# Patient Record
Sex: FEMALE | Race: OTHER | HISPANIC OR LATINO | URBAN - METROPOLITAN AREA
[De-identification: names, ages, dates, MRNs, and addresses within clinical notes are randomized per-mention and may not be internally consistent; named-entity substitution may affect disease eponyms.]

---

## 2022-04-17 ENCOUNTER — EMERGENCY (EMERGENCY)
Facility: HOSPITAL | Age: 77
LOS: 1 days | Discharge: HOME | End: 2022-04-17
Attending: EMERGENCY MEDICINE | Admitting: EMERGENCY MEDICINE
Payer: MEDICARE

## 2022-04-17 VITALS
SYSTOLIC BLOOD PRESSURE: 148 MMHG | HEART RATE: 93 BPM | OXYGEN SATURATION: 100 % | RESPIRATION RATE: 16 BRPM | DIASTOLIC BLOOD PRESSURE: 68 MMHG | TEMPERATURE: 99 F

## 2022-04-17 VITALS
OXYGEN SATURATION: 100 % | RESPIRATION RATE: 16 BRPM | SYSTOLIC BLOOD PRESSURE: 119 MMHG | TEMPERATURE: 98 F | DIASTOLIC BLOOD PRESSURE: 56 MMHG

## 2022-04-17 DIAGNOSIS — Z90.710 ACQUIRED ABSENCE OF BOTH CERVIX AND UTERUS: Chronic | ICD-10-CM

## 2022-04-17 LAB
ALBUMIN SERPL ELPH-MCNC: 4.5 G/DL — SIGNIFICANT CHANGE UP (ref 3.3–5)
ALP SERPL-CCNC: 97 U/L — SIGNIFICANT CHANGE UP (ref 40–120)
ALT FLD-CCNC: 20 U/L — SIGNIFICANT CHANGE UP (ref 4–33)
ANION GAP SERPL CALC-SCNC: 13 MMOL/L — SIGNIFICANT CHANGE UP (ref 7–14)
AST SERPL-CCNC: 20 U/L — SIGNIFICANT CHANGE UP (ref 4–32)
BASOPHILS # BLD AUTO: 0.04 K/UL — SIGNIFICANT CHANGE UP (ref 0–0.2)
BASOPHILS NFR BLD AUTO: 0.4 % — SIGNIFICANT CHANGE UP (ref 0–2)
BILIRUB SERPL-MCNC: 0.3 MG/DL — SIGNIFICANT CHANGE UP (ref 0.2–1.2)
BUN SERPL-MCNC: 26 MG/DL — HIGH (ref 7–23)
CALCIUM SERPL-MCNC: 9.3 MG/DL — SIGNIFICANT CHANGE UP (ref 8.4–10.5)
CHLORIDE SERPL-SCNC: 107 MMOL/L — SIGNIFICANT CHANGE UP (ref 98–107)
CO2 SERPL-SCNC: 18 MMOL/L — LOW (ref 22–31)
CREAT SERPL-MCNC: 1.5 MG/DL — HIGH (ref 0.5–1.3)
CULTURE RESULTS: SIGNIFICANT CHANGE UP
EGFR: 36 ML/MIN/1.73M2 — LOW
EOSINOPHIL # BLD AUTO: 0.54 K/UL — HIGH (ref 0–0.5)
EOSINOPHIL NFR BLD AUTO: 5.1 % — SIGNIFICANT CHANGE UP (ref 0–6)
GLUCOSE SERPL-MCNC: 184 MG/DL — HIGH (ref 70–99)
HCT VFR BLD CALC: 33 % — LOW (ref 34.5–45)
HGB BLD-MCNC: 10.5 G/DL — LOW (ref 11.5–15.5)
IANC: 8.45 K/UL — HIGH (ref 1.8–7.4)
IMM GRANULOCYTES NFR BLD AUTO: 0.3 % — SIGNIFICANT CHANGE UP (ref 0–1.5)
LYMPHOCYTES # BLD AUTO: 1.02 K/UL — SIGNIFICANT CHANGE UP (ref 1–3.3)
LYMPHOCYTES # BLD AUTO: 9.6 % — LOW (ref 13–44)
MAGNESIUM SERPL-MCNC: 2.1 MG/DL — SIGNIFICANT CHANGE UP (ref 1.6–2.6)
MCHC RBC-ENTMCNC: 29.6 PG — SIGNIFICANT CHANGE UP (ref 27–34)
MCHC RBC-ENTMCNC: 31.8 GM/DL — LOW (ref 32–36)
MCV RBC AUTO: 93 FL — SIGNIFICANT CHANGE UP (ref 80–100)
MONOCYTES # BLD AUTO: 0.57 K/UL — SIGNIFICANT CHANGE UP (ref 0–0.9)
MONOCYTES NFR BLD AUTO: 5.4 % — SIGNIFICANT CHANGE UP (ref 2–14)
NEUTROPHILS # BLD AUTO: 8.45 K/UL — HIGH (ref 1.8–7.4)
NEUTROPHILS NFR BLD AUTO: 79.2 % — HIGH (ref 43–77)
NRBC # BLD: 0 /100 WBCS — SIGNIFICANT CHANGE UP
NRBC # FLD: 0 K/UL — SIGNIFICANT CHANGE UP
PHOSPHATE SERPL-MCNC: 3.9 MG/DL — SIGNIFICANT CHANGE UP (ref 2.5–4.5)
PLATELET # BLD AUTO: 297 K/UL — SIGNIFICANT CHANGE UP (ref 150–400)
POTASSIUM SERPL-MCNC: 4.6 MMOL/L — SIGNIFICANT CHANGE UP (ref 3.5–5.3)
POTASSIUM SERPL-SCNC: 4.6 MMOL/L — SIGNIFICANT CHANGE UP (ref 3.5–5.3)
PROT SERPL-MCNC: 7.7 G/DL — SIGNIFICANT CHANGE UP (ref 6–8.3)
RBC # BLD: 3.55 M/UL — LOW (ref 3.8–5.2)
RBC # FLD: 12.8 % — SIGNIFICANT CHANGE UP (ref 10.3–14.5)
SODIUM SERPL-SCNC: 138 MMOL/L — SIGNIFICANT CHANGE UP (ref 135–145)
SPECIMEN SOURCE: SIGNIFICANT CHANGE UP
WBC # BLD: 10.65 K/UL — HIGH (ref 3.8–10.5)
WBC # FLD AUTO: 10.65 K/UL — HIGH (ref 3.8–10.5)

## 2022-04-17 PROCEDURE — 99284 EMERGENCY DEPT VISIT MOD MDM: CPT

## 2022-04-17 RX ORDER — VANCOMYCIN HCL 1 G
5 VIAL (EA) INTRAVENOUS
Qty: 280 | Refills: 0
Start: 2022-04-17 | End: 2022-04-30

## 2022-04-17 NOTE — ED ADULT NURSE REASSESSMENT NOTE - NS ED NURSE REASSESS COMMENT FT1
break cover RN. received report from  RN. Pt is a/o x 3. no complaints of chest pain, headache, nausea, dizzniness, vomiting, SOB, fever, chills   verbalized. Pt has iv placed with no redness or swelling noted. Awaiting further orders. Will continue to monitor.

## 2022-04-17 NOTE — ED ADULT NURSE REASSESSMENT NOTE - NS ED NURSE REASSESS COMMENT FT1
Report received from RN in blue 3: Pt A&Ox4, Kiswahili speaking, ambulatory w assistance, respirations are even and unlabored, sating at 100% on RA. Pt attempted to give a stool sample but states "I don't have stool anymore just mucous", MD notified.

## 2022-04-17 NOTE — ED PROVIDER NOTE - ATTENDING CONTRIBUTION TO CARE
The patient is a 76-year-old woman who had a recent dental infection managed with clindamycin and who is followed for chronic kidney disease, diabetes mellitus, and hypertension who is presenting today with several weeks of diarrhea. Initially, she called her primary care physician who told her to that she likely had a viral infection and to try pepto-bismol. Per the patient, she developed diarrhea about a week after she received her antibiotic course. Initially, her diarrhea was very voluminous. She had innumerable bowel movements in a day. However, she has since developed output of mucus that is so persistent that she is incontinent of stool. She has not had any vomiting or any fevers. This has never happened to her before. She has no previous intestinal surgeries. She has no known history of sexually transmitted infections. She denies abdominal pain. She has not taken any anti-diarrheal medications other than pepto-bismol, which she has not taken in several weeks.

## 2022-04-17 NOTE — ED PROVIDER NOTE - NSICDXPASTMEDICALHX_GEN_ALL_CORE_FT
PAST MEDICAL HISTORY:  Chronic kidney disease, unspecified CKD stage     Diabetes mellitus     Hypertension

## 2022-04-17 NOTE — ED PROVIDER NOTE - CLINICAL SUMMARY MEDICAL DECISION MAKING FREE TEXT BOX
The patient is a 76-year-old woman who had a recent dental infection managed with po clindamycin and who is followed for hypertension, diabetes mellitus, and chronic kidney disease who is presenting with several weeks of voluminous diarrhea, that has progressed to incontinent mucus, highly concerning for c diff colitis.     Will obtain stool c diff tests. Will order cbc and cmp. Likely will discharge with outpatient follow-up and po antibiotics.

## 2022-04-17 NOTE — ED ADULT TRIAGE NOTE - CHIEF COMPLAINT QUOTE
Patient c/o on and off diarrhea x a month with mucous. Feels abdominal pressure. Diabetes type 2   fs = 200.

## 2022-04-17 NOTE — ED PROVIDER NOTE - NS ED ROS FT
CONSTITUTIONAL: No fever, weight loss, or fatigue  EYES: No eye pain, visual disturbances, or discharge  ENMT:  No difficulty hearing, tinnitus, vertigo; No sinus or throat pain  NECK: No pain; no stiffness  BREASTS: No pain, masses, or nipple discharge  RESPIRATORY: No cough, wheezing, chills or hemoptysis; No shortness of breath  CARDIOVASCULAR: No chest pain, palpitations, dizziness, or leg swelling  GASTROINTESTINAL: No abdominal or epigastric pain. No nausea, vomiting, or hematemesis; diarrhea as noted above   GENITOURINARY: No dysuria, frequency, hematuria, or incontinence  NEUROLOGICAL: No headaches, memory loss, loss of strength, numbness, or tremors  SKIN: No itching, burning, rashes, or lesions   LYMPH NODES: No enlarged glands  ENDOCRINE: No heat or cold intolerance; No hair loss  MUSCULOSKELETAL: No joint pain or swelling; No muscle, back, or extremity pain  PSYCHIATRIC: No depression, anxiety, mood swings, or difficulty sleeping  HEME/LYMPH: No easy bruising, or bleeding gums  ALLERY AND IMMUNOLOGIC: No hives; no eczema

## 2022-04-17 NOTE — ED ADULT NURSE NOTE - OBJECTIVE STATEMENT
A&Ox4. ambulatory. c/o diarrhea and mucous for one month after taking antibiotics for a tooth infection. NAD. pt is Icelandic speaking. daughter at bedside, translating. pt refuses translation services. pt denies chest pain, dizziness, SOB, n/v/d, HA, fevers, chills, urinary symptoms. respirations are even and un labored. abd is soft and non tender. skin intact. 22g placed to right ac. labs drawn and sent. safety precautions maintained.

## 2022-04-17 NOTE — ED PROVIDER NOTE - PATIENT PORTAL LINK FT
You can access the FollowMyHealth Patient Portal offered by [facility] by registering at the following website: [email or web address]. By joining [facility]’s FollowMyHealth portal, you will also be able to view your health information using other applications (apps) compatible with our system.

## 2022-04-17 NOTE — ED PROVIDER NOTE - NSFOLLOWUPINSTRUCTIONS_ED_ALL_ED_FT
When you first came to the emergency department, you noticed that you had been having diarrhea for several weeks. Because your diarrhea first began shortly after you completed a regimen of oral antibiotics to treat a mouth infection, your doctors worried that your diarrhea was caused by an infection in your colon called C diff. This infection occurs often in patients who take antibiotics. The antibiotics, in addition serving their purpose to treat infection elsewhere in your body, can harm the "good" bacteria in the colon, which can allow the "bad" bacteria, the C diff, to live and prosper and cause infection and diarrhea. For that reason, here in the emergency department, we ran a test to see if this was the cause of your diarrhea. Although those test results are not back yet, we feel that it is likely that you have this cause of diarrhea. For that reason, we recommend that you begin treatment with antibiotics to manage your c diff. At this time, you are able to walk, eat, drink, and use the restroom on your own, so we feel that your condition is stable and that  you are ready for discharge home. If at any time you develop fever or chills or sever abdominal discomfort or vomiting, please return to the hospital.

## 2022-04-18 PROBLEM — E11.9 TYPE 2 DIABETES MELLITUS WITHOUT COMPLICATIONS: Chronic | Status: ACTIVE | Noted: 2022-04-17

## 2022-04-18 PROBLEM — I10 ESSENTIAL (PRIMARY) HYPERTENSION: Chronic | Status: ACTIVE | Noted: 2022-04-17

## 2022-04-18 PROBLEM — N18.9 CHRONIC KIDNEY DISEASE, UNSPECIFIED: Chronic | Status: ACTIVE | Noted: 2022-04-17

## 2022-04-18 LAB
C DIFF BY PCR RESULT: DETECTED
C DIFF TOX GENS STL QL NAA+PROBE: SIGNIFICANT CHANGE UP

## 2022-04-18 RX ORDER — VANCOMYCIN HCL 1 G
1 VIAL (EA) INTRAVENOUS
Qty: 28 | Refills: 0
Start: 2022-04-18 | End: 2022-04-24

## 2022-04-18 RX ORDER — VANCOMYCIN HCL 1 G
5 VIAL (EA) INTRAVENOUS
Qty: 200 | Refills: 0
Start: 2022-04-18 | End: 2022-04-27

## 2022-04-19 LAB
CULTURE RESULTS: SIGNIFICANT CHANGE UP
SPECIMEN SOURCE: SIGNIFICANT CHANGE UP

## 2022-04-19 NOTE — ED POST DISCHARGE NOTE - ADDITIONAL DOCUMENTATION
SPoke with daughter who is patient's health care proxy and informed of results.  Patient is being appropriately treated with vancomycin, daughter states rx has not been picked up yet, confirmed with pharmacy that rx is covered by insurance.

## 2022-04-20 ENCOUNTER — APPOINTMENT (OUTPATIENT)
Dept: GASTROENTEROLOGY | Facility: CLINIC | Age: 77
End: 2022-04-20
Payer: MEDICARE

## 2022-04-20 VITALS
DIASTOLIC BLOOD PRESSURE: 78 MMHG | HEART RATE: 73 BPM | HEIGHT: 63 IN | OXYGEN SATURATION: 100 % | WEIGHT: 149 LBS | SYSTOLIC BLOOD PRESSURE: 145 MMHG | BODY MASS INDEX: 26.4 KG/M2 | TEMPERATURE: 98 F

## 2022-04-20 DIAGNOSIS — Z86.79 PERSONAL HISTORY OF OTHER DISEASES OF THE CIRCULATORY SYSTEM: ICD-10-CM

## 2022-04-20 DIAGNOSIS — Z86.39 PERSONAL HISTORY OF OTHER ENDOCRINE, NUTRITIONAL AND METABOLIC DISEASE: ICD-10-CM

## 2022-04-20 DIAGNOSIS — R19.5 OTHER FECAL ABNORMALITIES: ICD-10-CM

## 2022-04-20 DIAGNOSIS — Z63.4 DISAPPEARANCE AND DEATH OF FAMILY MEMBER: ICD-10-CM

## 2022-04-20 DIAGNOSIS — K92.1 MELENA: ICD-10-CM

## 2022-04-20 PROCEDURE — 99204 OFFICE O/P NEW MOD 45 MIN: CPT

## 2022-04-20 RX ORDER — ROSUVASTATIN CALCIUM 20 MG/1
20 TABLET, FILM COATED ORAL
Refills: 0 | Status: ACTIVE | COMMUNITY

## 2022-04-20 RX ORDER — VANCOMYCIN HYDROCHLORIDE 125 MG/1
125 CAPSULE ORAL
Refills: 0 | Status: ACTIVE | COMMUNITY

## 2022-04-20 RX ORDER — ENALAPRIL MALEATE 2.5 MG/1
2.5 TABLET ORAL
Refills: 0 | Status: ACTIVE | COMMUNITY

## 2022-04-20 RX ORDER — GLIPIZIDE 10 MG/1
10 TABLET ORAL
Refills: 0 | Status: ACTIVE | COMMUNITY

## 2022-04-20 RX ORDER — AMLODIPINE BESYLATE 5 MG/1
5 TABLET ORAL
Refills: 0 | Status: ACTIVE | COMMUNITY

## 2022-04-20 SDOH — SOCIAL STABILITY - SOCIAL INSECURITY: DISSAPEARANCE AND DEATH OF FAMILY MEMBER: Z63.4

## 2022-04-20 NOTE — PHYSICAL EXAM
[General Appearance - Alert] : alert [General Appearance - In No Acute Distress] : in no acute distress [Sclera] : the sclera and conjunctiva were normal [PERRL With Normal Accommodation] : pupils were equal in size, round, and reactive to light [Extraocular Movements] : extraocular movements were intact [Outer Ear] : the ears and nose were normal in appearance [Oropharynx] : the oropharynx was normal [Neck Appearance] : the appearance of the neck was normal [Neck Cervical Mass (___cm)] : no neck mass was observed [Jugular Venous Distention Increased] : there was no jugular-venous distention [Thyroid Diffuse Enlargement] : the thyroid was not enlarged [Thyroid Nodule] : there were no palpable thyroid nodules [] : no respiratory distress [Auscultation Breath Sounds / Voice Sounds] : lungs were clear to auscultation bilaterally [Heart Rate And Rhythm] : heart rate was normal and rhythm regular [Heart Sounds] : normal S1 and S2 [Heart Sounds Gallop] : no gallops [Murmurs] : no murmurs [Heart Sounds Pericardial Friction Rub] : no pericardial rub [Flat] : flat [Soft, Nontender] : the abdomen was soft and nontender [Normal] : normal to percussion [Cervical Lymph Nodes Enlarged Posterior Bilaterally] : posterior cervical [Cervical Lymph Nodes Enlarged Anterior Bilaterally] : anterior cervical [Supraclavicular Lymph Nodes Enlarged Bilaterally] : supraclavicular [Axillary Lymph Nodes Enlarged Bilaterally] : axillary [Femoral Lymph Nodes Enlarged Bilaterally] : femoral [Inguinal Lymph Nodes Enlarged Bilaterally] : inguinal [No CVA Tenderness] : no ~M costovertebral angle tenderness [No Spinal Tenderness] : no spinal tenderness [Abnormal Walk] : normal gait [Nail Clubbing] : no clubbing  or cyanosis of the fingernails [Musculoskeletal - Swelling] : no joint swelling seen [Motor Tone] : muscle strength and tone were normal [Deep Tendon Reflexes (DTR)] : deep tendon reflexes were 2+ and symmetric [Sensation] : the sensory exam was normal to light touch and pinprick [No Focal Deficits] : no focal deficits [Oriented To Time, Place, And Person] : oriented to person, place, and time [Impaired Insight] : insight and judgment were intact [Affect] : the affect was normal [Firm] : not firm [Rigid] : not rigid [Rebound] : no rebound [Guarding] : no guarding [Lopez's] : a negative Lopez's sign

## 2022-04-20 NOTE — REASON FOR VISIT
[Consultation] : a consultation visit [FreeTextEntry1] : C- diff infection/ Hospital f/u from 4/17/22

## 2022-04-20 NOTE — HISTORY OF PRESENT ILLNESS
[Heartburn] : denies heartburn [Nausea] : denies nausea [Vomiting] : denies vomiting [Diarrhea] : improved diarrhea [Constipation] : denies constipation [Yellow Skin Or Eyes (Jaundice)] : denies jaundice [Abdominal Pain] : resolved abdominal pain [Abdominal Swelling] : denies abdominal swelling [Rectal Pain] : denies rectal pain [Cholelithiasis] : cholelithiasis [GERD] : no gastroesophageal reflux disease [Hiatus Hernia] : no hiatus hernia [Peptic Ulcer Disease] : no peptic ulcer disease [Pancreatitis] : no pancreatitis [Kidney Stone] : no kidney stone [Inflammatory Bowel Disease] : no inflammatory bowel disease [Irritable Bowel Syndrome] : no irritable bowel syndrome [Diverticulitis] : no diverticulitis [Alcohol Abuse] : no alcohol abuse [Malignancy] : no malignancy [Abdominal Surgery] : no abdominal surgery [Appendectomy] : no appendectomy [Cholecystectomy] : no cholecystectomy [de-identified] : Patient primarily Kenyan speaking presents with son refused  phone. One was initially used Hector #[id number]\par The patient is a 76-year-old woman who had a recent\par dental infection managed with clindamycin and who is followed for chronic\par kidney disease, diabetes mellitus, and hypertension who is presenting today\par with several weeks of diarrhea. Initially, she called her primary care\par physician who told her to that she likely had a viral infection and to try\par pepto-bismol. Per the patient, she developed diarrhea about a week after she\par received her antibiotic course. Initially, her diarrhea was very voluminous.\par She had innumerable bowel movements in a day. However, she has since developed\par output of mucus that is so persistent that she is incontinent of stool. She has\par not had any vomiting or any fevers. This has never happened to her before. She\par has no previous intestinal surgeries. She has no known history of sexually\par transmitted infections. She denies abdominal pain. She has not taken any\par anti-diarrheal medications other than pepto-bismol, which she has not taken in\par several weeks.\par Denies rectal bleeding, blood in the stool, melena, or hematemesis. \par \par Currently patient states diarrhea has resolved as of yesterday midday.\par Patient had a colonoscopy preformed at [facility] 9/24/2020 per patient results were normal.

## 2022-04-20 NOTE — ASSESSMENT
[FreeTextEntry1] : Attending Attestation \par \par C- Diff Infection \par \par Patient will continue to take Vancomycin and complete full course of antibiotics. As per Dr Kelly after completion a repeat stool for C diff will be done.  \par She will f/u in office in 6 weeks. \par \par Instructions provided to patient how to obtain stool for c-diff sample. \par \par If symptoms worsen or becomes severe will call office immediately or seek emergency treatment. \par \par Time spent with patient 45 min \par \par Patient verbalized understanding of all information provided. All questions answered and reviewed.

## 2022-05-23 LAB
C DIFF TOXIN B QL PCR REFLEX: NORMAL
GDH ANTIGEN: DETECTED
TOXIN A AND B: DETECTED

## 2022-05-23 RX ORDER — FIDAXOMICIN 200 MG/1
200 TABLET, FILM COATED ORAL TWICE DAILY
Qty: 20 | Refills: 1 | Status: ACTIVE | COMMUNITY
Start: 2022-05-23 | End: 1900-01-01

## 2022-06-06 ENCOUNTER — APPOINTMENT (OUTPATIENT)
Dept: GASTROENTEROLOGY | Facility: CLINIC | Age: 77
End: 2022-06-06
Payer: MEDICARE

## 2022-06-06 VITALS
TEMPERATURE: 97.6 F | SYSTOLIC BLOOD PRESSURE: 148 MMHG | OXYGEN SATURATION: 100 % | WEIGHT: 149 LBS | BODY MASS INDEX: 26.4 KG/M2 | HEART RATE: 77 BPM | HEIGHT: 63 IN | RESPIRATION RATE: 17 BRPM | DIASTOLIC BLOOD PRESSURE: 77 MMHG

## 2022-06-06 DIAGNOSIS — A04.72 ENTEROCOLITIS DUE TO CLOSTRIDIUM DIFFICILE, NOT SPECIFIED AS RECURRENT: ICD-10-CM

## 2022-06-06 PROCEDURE — 99213 OFFICE O/P EST LOW 20 MIN: CPT

## 2022-06-08 PROBLEM — A04.72 C. DIFFICILE DIARRHEA: Status: ACTIVE | Noted: 2022-04-20

## 2022-06-08 NOTE — HISTORY OF PRESENT ILLNESS
[de-identified] : The patient is a 76-year-old woman who had a recent\par dental infection managed with clindamycin and who is followed for chronic\par kidney disease, diabetes mellitus, and hypertension who is presenting today\par with several weeks of diarrhea. Initially, she called her primary care\par physician who told her to that she likely had a viral infection and to try\par pepto-bismol. Per the patient, she developed diarrhea about a week after she\par received her antibiotic course. Initially, her diarrhea was very voluminous.\par She had innumerable bowel movements in a day. However, she has since developed\par output of mucus that is so persistent that she is incontinent of stool. She has\par not had any vomiting or any fevers. This has never happened to her before. She\par has no previous intestinal surgeries. She has no known history of sexually\par transmitted infections. She denies abdominal pain. She has not taken any\par anti-diarrheal medications other than pepto-bismol, which she has not taken in\par several weeks.\par Denies rectal bleeding, blood in the stool, melena, or hematemesis. \par \par \par \par \par \par patient's daughter Suellen Blanc that the patient's stool culture remains positive for C. difficile. Her diarrhea does appear to be better but she still has some episodes. Will treat with Dificid 200 mg twice daily for 10 days.. \par \par  \par

## 2022-06-08 NOTE — ASSESSMENT
[FreeTextEntry1] : Patient presents for a f/u stool test for c diff infection. She currently is not exhibiting any symptoms but is taking Dificid from positive result from 5/19/22. \par \par C- Diff stool culture ordered instructions provided to patient how to obtain sample and where to return specimen. \par \par Time spent with patient 20 min \par \par Patient verbalized understanding of all information provided. All questions answered and reviewed.

## 2022-06-16 LAB
C DIFF TOXIN B QL PCR REFLEX: NORMAL
GDH ANTIGEN: NOT DETECTED
TOXIN A AND B: NOT DETECTED

## 2022-06-20 ENCOUNTER — TRANSCRIPTION ENCOUNTER (OUTPATIENT)
Age: 77
End: 2022-06-20

## 2022-08-15 ENCOUNTER — EMERGENCY (EMERGENCY)
Facility: HOSPITAL | Age: 77
LOS: 1 days | Discharge: HOME | End: 2022-08-15
Attending: STUDENT IN AN ORGANIZED HEALTH CARE EDUCATION/TRAINING PROGRAM | Admitting: STUDENT IN AN ORGANIZED HEALTH CARE EDUCATION/TRAINING PROGRAM

## 2022-08-15 VITALS
HEART RATE: 87 BPM | DIASTOLIC BLOOD PRESSURE: 47 MMHG | RESPIRATION RATE: 20 BRPM | SYSTOLIC BLOOD PRESSURE: 132 MMHG | OXYGEN SATURATION: 100 % | TEMPERATURE: 98 F

## 2022-08-15 VITALS
OXYGEN SATURATION: 100 % | DIASTOLIC BLOOD PRESSURE: 54 MMHG | RESPIRATION RATE: 17 BRPM | HEART RATE: 74 BPM | TEMPERATURE: 98 F | SYSTOLIC BLOOD PRESSURE: 136 MMHG

## 2022-08-15 DIAGNOSIS — Z90.710 ACQUIRED ABSENCE OF BOTH CERVIX AND UTERUS: Chronic | ICD-10-CM

## 2022-08-15 LAB
ALBUMIN SERPL ELPH-MCNC: 4.2 G/DL — SIGNIFICANT CHANGE UP (ref 3.3–5)
ALP SERPL-CCNC: 86 U/L — SIGNIFICANT CHANGE UP (ref 40–120)
ALT FLD-CCNC: 16 U/L — SIGNIFICANT CHANGE UP (ref 4–33)
ANION GAP SERPL CALC-SCNC: 14 MMOL/L — SIGNIFICANT CHANGE UP (ref 7–14)
AST SERPL-CCNC: 19 U/L — SIGNIFICANT CHANGE UP (ref 4–32)
BASOPHILS # BLD AUTO: 0.03 K/UL — SIGNIFICANT CHANGE UP (ref 0–0.2)
BASOPHILS NFR BLD AUTO: 0.5 % — SIGNIFICANT CHANGE UP (ref 0–2)
BILIRUB SERPL-MCNC: 0.3 MG/DL — SIGNIFICANT CHANGE UP (ref 0.2–1.2)
BUN SERPL-MCNC: 24 MG/DL — HIGH (ref 7–23)
CALCIUM SERPL-MCNC: 9 MG/DL — SIGNIFICANT CHANGE UP (ref 8.4–10.5)
CHLORIDE SERPL-SCNC: 107 MMOL/L — SIGNIFICANT CHANGE UP (ref 98–107)
CO2 SERPL-SCNC: 17 MMOL/L — LOW (ref 22–31)
CREAT SERPL-MCNC: 1.49 MG/DL — HIGH (ref 0.5–1.3)
EGFR: 36 ML/MIN/1.73M2 — LOW
EOSINOPHIL # BLD AUTO: 0.17 K/UL — SIGNIFICANT CHANGE UP (ref 0–0.5)
EOSINOPHIL NFR BLD AUTO: 2.9 % — SIGNIFICANT CHANGE UP (ref 0–6)
GLUCOSE SERPL-MCNC: 162 MG/DL — HIGH (ref 70–99)
HCT VFR BLD CALC: 33.9 % — LOW (ref 34.5–45)
HGB BLD-MCNC: 10.8 G/DL — LOW (ref 11.5–15.5)
IANC: 4.11 K/UL — SIGNIFICANT CHANGE UP (ref 1.8–7.4)
IMM GRANULOCYTES NFR BLD AUTO: 0.3 % — SIGNIFICANT CHANGE UP (ref 0–1.5)
LYMPHOCYTES # BLD AUTO: 0.96 K/UL — LOW (ref 1–3.3)
LYMPHOCYTES # BLD AUTO: 16.3 % — SIGNIFICANT CHANGE UP (ref 13–44)
MAGNESIUM SERPL-MCNC: 2.1 MG/DL — SIGNIFICANT CHANGE UP (ref 1.6–2.6)
MCHC RBC-ENTMCNC: 28.8 PG — SIGNIFICANT CHANGE UP (ref 27–34)
MCHC RBC-ENTMCNC: 31.9 GM/DL — LOW (ref 32–36)
MCV RBC AUTO: 90.4 FL — SIGNIFICANT CHANGE UP (ref 80–100)
MONOCYTES # BLD AUTO: 0.61 K/UL — SIGNIFICANT CHANGE UP (ref 0–0.9)
MONOCYTES NFR BLD AUTO: 10.3 % — SIGNIFICANT CHANGE UP (ref 2–14)
NEUTROPHILS # BLD AUTO: 4.11 K/UL — SIGNIFICANT CHANGE UP (ref 1.8–7.4)
NEUTROPHILS NFR BLD AUTO: 69.7 % — SIGNIFICANT CHANGE UP (ref 43–77)
NRBC # BLD: 0 /100 WBCS — SIGNIFICANT CHANGE UP
NRBC # FLD: 0 K/UL — SIGNIFICANT CHANGE UP
PLATELET # BLD AUTO: 258 K/UL — SIGNIFICANT CHANGE UP (ref 150–400)
POTASSIUM SERPL-MCNC: 4.3 MMOL/L — SIGNIFICANT CHANGE UP (ref 3.5–5.3)
POTASSIUM SERPL-SCNC: 4.3 MMOL/L — SIGNIFICANT CHANGE UP (ref 3.5–5.3)
PROT SERPL-MCNC: 7.8 G/DL — SIGNIFICANT CHANGE UP (ref 6–8.3)
RBC # BLD: 3.75 M/UL — LOW (ref 3.8–5.2)
RBC # FLD: 13.4 % — SIGNIFICANT CHANGE UP (ref 10.3–14.5)
SODIUM SERPL-SCNC: 138 MMOL/L — SIGNIFICANT CHANGE UP (ref 135–145)
WBC # BLD: 5.9 K/UL — SIGNIFICANT CHANGE UP (ref 3.8–10.5)
WBC # FLD AUTO: 5.9 K/UL — SIGNIFICANT CHANGE UP (ref 3.8–10.5)

## 2022-08-15 PROCEDURE — 99284 EMERGENCY DEPT VISIT MOD MDM: CPT

## 2022-08-15 RX ORDER — SODIUM CHLORIDE 9 MG/ML
1000 INJECTION, SOLUTION INTRAVENOUS ONCE
Refills: 0 | Status: COMPLETED | OUTPATIENT
Start: 2022-08-15 | End: 2022-08-15

## 2022-08-15 RX ADMIN — SODIUM CHLORIDE 1000 MILLILITER(S): 9 INJECTION, SOLUTION INTRAVENOUS at 11:27

## 2022-08-15 NOTE — ED PROVIDER NOTE - NSFOLLOWUPINSTRUCTIONS_ED_ALL_ED_FT
- Please follow up with your Gastroenterologist within 1 week. Bring your results from today.  Please call their office to make an appointment, they're expecting you.     - Stay well hydrated.    - Be sure to return to the ED if you develop new, worsening, or any distressing symptoms. - Please follow up with your Gastroenterologist within 1 week. Bring your results from today.  Please call their office to make an appointment, they're expecting you.     - Try to collect a stool sample to bring to your GI doctor's office this week.     - Stay well hydrated.    - Be sure to return to the ED if you develop new, worsening, or any distressing symptoms.

## 2022-08-15 NOTE — ED PROVIDER NOTE - PATIENT PORTAL LINK FT
You can access the FollowMyHealth Patient Portal offered by [facility] by registering at the following website: [email or web address]. By joining [facility]’s FollowMyHealth portal, you will also be able to view your health information using other applications (apps) compatible with our system.

## 2022-08-15 NOTE — ED PROVIDER NOTE - ATTENDING CONTRIBUTION TO CARE
Dr. Saunders, Attending Physician-  I performed a face to face bedside interview with patient regarding history of present illness, review of symptoms and past medical history. I completed an independent physical exam.  I have discussed patient's plan of care with the fellow.    76F, CKD, HTN, DM, Cdiff infx treated with fidaxomicin in 06/22, presenting with watery stools. Reports persistent watery stools for the past week; non-bloody in nature. Tolerating PO. Voiding without any issues. No recent travel, sick contacts. No recent ABX. No headaches, fevers, chills, cough, sputum, cp, belly pain, vomiting, dysuria, hematuria, recent travel, trauma, syncope. Physical: afebrile, well appearing, rrr, ctabl, abdomen soft, no cvat, no le edema. Plan: screening labs, will try to contact patients GI.

## 2022-08-15 NOTE — ED PROVIDER NOTE - PROGRESS NOTE DETAILS
Reina, Med Tox Fellow: spoke with pts GI doc Donnell Kelly MD  Recs for us to try to get stool sample, if we're able to send off cdfiff tests he wants to start pt on vanco/flagyl regimen for 10 days.   If we're unable to get stool sample he states we shouldn't start these meds. Pt is able to f/u with GI NP in the office this week. Reina, Med Tox Fellow: pt tried multiple times, unable to provide stool sample  Gave pt sample cup to go home with. Will hold empiric tx per pts GI doc. Pt stable for dc. Reina, Med Tox Fellow: pt tried multiple times, unable to provide stool sample. Pts Cr at baseline.   Gave pt sample cup to go home with. Will hold empiric tx per pts GI doc. Pt stable for dc.

## 2022-08-15 NOTE — ED PROVIDER NOTE - PHYSICAL EXAMINATION
GENERAL: no acute distress, non-toxic appearing  HEENT: normal conjunctiva, oral mucosa moist  CARDIAC: regular rate and regular rhythm, bp reassuring  PULM: clear to ascultation bilaterally, no incr wob  GI: abdomen nondistended, soft, nontender  : no suprapubic tenderness  NEURO: alert and oriented x 3, normal speech, moving all extremities without lateralization  MSK: no visible deformities, no peripheral edema, calf tenderness/redness/swelling  SKIN: no visible rashes  PSYCH: appropriate mood and affect

## 2022-08-15 NOTE — ED PROVIDER NOTE - CLINICAL SUMMARY MEDICAL DECISION MAKING FREE TEXT BOX
Reina Med Tox Fellow: concern for cdiff recurrance, low suspicion for cdiff complications based on stable vitals/nondistended/nontender abdomen. Will check lytes/kidney funx, try to obtain stool sample. Speak with pts GI doc. Reassess.

## 2022-08-15 NOTE — ED ADULT NURSE NOTE - OBJECTIVE STATEMENT
patient presents to ED c/o multiple episodes of diarrhea x week, about 5-6 x a day.  denies any blood . last episode was this morning. reports anything she eats makes her go to the bathroom. patient reports hx of C Difficile in April. endorses at times she feels nauseas and feeling chills. denies cp, sob, vomiting, fever, abdominal pain.

## 2022-08-15 NOTE — ED PROVIDER NOTE - OBJECTIVE STATEMENT
76F PMH CKD, HTN, DM, Cdiff infx treated with fidaxomicin in 06/22, presents to the ED with 1 week of nonbloody diarrhea approx 5x/day, states it may be associated with eating as well as some nausea and slight chills. Pt denies any uri sxs, cough, chest pain, sob, lightheadedness/palpitations, vomiting, urinary sxs, rash.

## 2022-08-17 ENCOUNTER — NON-APPOINTMENT (OUTPATIENT)
Age: 77
End: 2022-08-17

## 2022-08-17 DIAGNOSIS — K58.0 IRRITABLE BOWEL SYNDROME WITH DIARRHEA: ICD-10-CM

## 2022-08-17 LAB
C DIFF BY PCR RESULT: DETECTED
C DIFF TOX GENS STL QL NAA+PROBE: SIGNIFICANT CHANGE UP

## 2022-08-17 RX ORDER — RIFAXIMIN 550 MG/1
550 TABLET ORAL 3 TIMES DAILY
Qty: 42 | Refills: 1 | Status: ACTIVE | COMMUNITY
Start: 2022-08-17 | End: 1900-01-01

## 2022-08-17 RX ORDER — METRONIDAZOLE 500 MG
1 TABLET ORAL
Qty: 42 | Refills: 0
Start: 2022-08-17 | End: 2022-08-30

## 2022-08-17 NOTE — ED POST DISCHARGE NOTE - REASON FOR FOLLOW-UP
Other C; diff : detected S/W patient's daughter . Patient had C Diff in April ERXD VAncomycin which was $700 discussed with attending can give flagyl  instead. Discussed with daughter will ERX Falgyl. Patient has follow up with GI MD on Monday. Strict return precautions given to patient's daughter.

## 2022-08-22 ENCOUNTER — APPOINTMENT (OUTPATIENT)
Dept: GASTROENTEROLOGY | Facility: CLINIC | Age: 77
End: 2022-08-22

## 2022-10-16 LAB
C DIFF TOX GENS STL QL NAA+PROBE: NORMAL
CDIFF BY PCR: DETECTED

## 2022-11-07 ENCOUNTER — APPOINTMENT (OUTPATIENT)
Dept: GASTROENTEROLOGY | Facility: CLINIC | Age: 77
End: 2022-11-07

## 2022-11-11 ENCOUNTER — NON-APPOINTMENT (OUTPATIENT)
Age: 77
End: 2022-11-11

## 2022-11-11 LAB
C DIFF TOX B STL QL CT TISS CULT: ABNORMAL
Lab: ABNORMAL

## 2022-11-11 RX ORDER — VANCOMYCIN HYDROCHLORIDE 250 MG/1
250 CAPSULE ORAL
Qty: 42 | Refills: 1 | Status: ACTIVE | COMMUNITY
Start: 2022-11-11 | End: 1900-01-01

## 2022-11-11 RX ORDER — METRONIDAZOLE 500 MG/1
500 TABLET ORAL
Qty: 42 | Refills: 0 | Status: ACTIVE | COMMUNITY
Start: 2022-11-11 | End: 1900-01-01

## 2024-05-13 ENCOUNTER — EMERGENCY (EMERGENCY)
Facility: HOSPITAL | Age: 79
LOS: 1 days | Discharge: HOME | End: 2024-05-13
Attending: EMERGENCY MEDICINE | Admitting: EMERGENCY MEDICINE
Payer: MEDICARE

## 2024-05-13 VITALS
OXYGEN SATURATION: 99 % | SYSTOLIC BLOOD PRESSURE: 164 MMHG | TEMPERATURE: 98 F | HEART RATE: 75 BPM | DIASTOLIC BLOOD PRESSURE: 78 MMHG | RESPIRATION RATE: 16 BRPM

## 2024-05-13 VITALS
HEART RATE: 73 BPM | OXYGEN SATURATION: 100 % | DIASTOLIC BLOOD PRESSURE: 64 MMHG | RESPIRATION RATE: 15 BRPM | TEMPERATURE: 98 F | SYSTOLIC BLOOD PRESSURE: 126 MMHG

## 2024-05-13 DIAGNOSIS — Z90.710 ACQUIRED ABSENCE OF BOTH CERVIX AND UTERUS: Chronic | ICD-10-CM

## 2024-05-13 LAB
ALBUMIN SERPL ELPH-MCNC: 4.2 G/DL — SIGNIFICANT CHANGE UP (ref 3.3–5)
ALP SERPL-CCNC: 128 U/L — HIGH (ref 40–120)
ALT FLD-CCNC: 16 U/L — SIGNIFICANT CHANGE UP (ref 4–33)
ANION GAP SERPL CALC-SCNC: 12 MMOL/L — SIGNIFICANT CHANGE UP (ref 7–14)
APTT BLD: 28.8 SEC — SIGNIFICANT CHANGE UP (ref 24.5–35.6)
AST SERPL-CCNC: 23 U/L — SIGNIFICANT CHANGE UP (ref 4–32)
BASOPHILS # BLD AUTO: 0.05 K/UL — SIGNIFICANT CHANGE UP (ref 0–0.2)
BASOPHILS NFR BLD AUTO: 0.7 % — SIGNIFICANT CHANGE UP (ref 0–2)
BILIRUB SERPL-MCNC: 0.3 MG/DL — SIGNIFICANT CHANGE UP (ref 0.2–1.2)
BUN SERPL-MCNC: 31 MG/DL — HIGH (ref 7–23)
CALCIUM SERPL-MCNC: 9.1 MG/DL — SIGNIFICANT CHANGE UP (ref 8.4–10.5)
CHLORIDE SERPL-SCNC: 106 MMOL/L — SIGNIFICANT CHANGE UP (ref 98–107)
CO2 SERPL-SCNC: 19 MMOL/L — LOW (ref 22–31)
CREAT SERPL-MCNC: 1.28 MG/DL — SIGNIFICANT CHANGE UP (ref 0.5–1.3)
EGFR: 43 ML/MIN/1.73M2 — LOW
EOSINOPHIL # BLD AUTO: 0.33 K/UL — SIGNIFICANT CHANGE UP (ref 0–0.5)
EOSINOPHIL NFR BLD AUTO: 4.8 % — SIGNIFICANT CHANGE UP (ref 0–6)
GLUCOSE SERPL-MCNC: 114 MG/DL — HIGH (ref 70–99)
HCT VFR BLD CALC: 35.8 % — SIGNIFICANT CHANGE UP (ref 34.5–45)
HGB BLD-MCNC: 11.6 G/DL — SIGNIFICANT CHANGE UP (ref 11.5–15.5)
IANC: 4.49 K/UL — SIGNIFICANT CHANGE UP (ref 1.8–7.4)
IMM GRANULOCYTES NFR BLD AUTO: 0.4 % — SIGNIFICANT CHANGE UP (ref 0–0.9)
INR BLD: 0.96 RATIO — SIGNIFICANT CHANGE UP (ref 0.85–1.18)
LYMPHOCYTES # BLD AUTO: 1.41 K/UL — SIGNIFICANT CHANGE UP (ref 1–3.3)
LYMPHOCYTES # BLD AUTO: 20.5 % — SIGNIFICANT CHANGE UP (ref 13–44)
MCHC RBC-ENTMCNC: 30.2 PG — SIGNIFICANT CHANGE UP (ref 27–34)
MCHC RBC-ENTMCNC: 32.4 GM/DL — SIGNIFICANT CHANGE UP (ref 32–36)
MCV RBC AUTO: 93.2 FL — SIGNIFICANT CHANGE UP (ref 80–100)
MONOCYTES # BLD AUTO: 0.57 K/UL — SIGNIFICANT CHANGE UP (ref 0–0.9)
MONOCYTES NFR BLD AUTO: 8.3 % — SIGNIFICANT CHANGE UP (ref 2–14)
NEUTROPHILS # BLD AUTO: 4.49 K/UL — SIGNIFICANT CHANGE UP (ref 1.8–7.4)
NEUTROPHILS NFR BLD AUTO: 65.3 % — SIGNIFICANT CHANGE UP (ref 43–77)
NRBC # BLD: 0 /100 WBCS — SIGNIFICANT CHANGE UP (ref 0–0)
NRBC # FLD: 0 K/UL — SIGNIFICANT CHANGE UP (ref 0–0)
PLATELET # BLD AUTO: 220 K/UL — SIGNIFICANT CHANGE UP (ref 150–400)
POTASSIUM SERPL-MCNC: 5.3 MMOL/L — SIGNIFICANT CHANGE UP (ref 3.5–5.3)
POTASSIUM SERPL-SCNC: 5.3 MMOL/L — SIGNIFICANT CHANGE UP (ref 3.5–5.3)
PROT SERPL-MCNC: 7.5 G/DL — SIGNIFICANT CHANGE UP (ref 6–8.3)
PROTHROM AB SERPL-ACNC: 10.8 SEC — SIGNIFICANT CHANGE UP (ref 9.5–13)
RBC # BLD: 3.84 M/UL — SIGNIFICANT CHANGE UP (ref 3.8–5.2)
RBC # FLD: 13.2 % — SIGNIFICANT CHANGE UP (ref 10.3–14.5)
SODIUM SERPL-SCNC: 137 MMOL/L — SIGNIFICANT CHANGE UP (ref 135–145)
WBC # BLD: 6.88 K/UL — SIGNIFICANT CHANGE UP (ref 3.8–10.5)
WBC # FLD AUTO: 6.88 K/UL — SIGNIFICANT CHANGE UP (ref 3.8–10.5)

## 2024-05-13 PROCEDURE — 99285 EMERGENCY DEPT VISIT HI MDM: CPT

## 2024-05-13 PROCEDURE — 70496 CT ANGIOGRAPHY HEAD: CPT | Mod: 26,MC

## 2024-05-13 PROCEDURE — 99283 EMERGENCY DEPT VISIT LOW MDM: CPT

## 2024-05-13 PROCEDURE — 70498 CT ANGIOGRAPHY NECK: CPT | Mod: 26,MC

## 2024-05-13 PROCEDURE — 93010 ELECTROCARDIOGRAM REPORT: CPT

## 2024-05-13 NOTE — ED ADULT NURSE NOTE - OBJECTIVE STATEMENT
Pt received to spot 7a. pt is AxO3 and ambulatory. pt son at bedside to translate. pt c/o dizziness,, and feeling the room spin x 3days. pt states her symptoms are worse when she is laying down, or if she turns her head to the sides. pt endorses hx of HTN, type 2 DM (denies use of insulin), and HLD. pt respirations even and unlabored. -BEFAST. pt has equal sensation and strength bilateral. Pt denies headaches, SOB, abdominal pain, chest pain, or fever like symptoms. Pt awaiting MD orders. Plan of care ongoing.

## 2024-05-13 NOTE — CONSULT NOTE ADULT - ATTENDING COMMENTS
Vertigo, isolated.  Neuro ROS is otherwise negative. Worse with head turning.     No corrective saccade with head impulse test.  No nystagmus.  No skew deviation.   No ataxia - FNF, BASIL, HKS  Gait - normal heel, toe, tandem.      A/P  Ms. Wetzel is a 77 yo woman with peripheral vertigo.   [facility] - [phone]  Please provider educational resource: [email or web address]   I agree with work up and management as above.   Thank you.

## 2024-05-13 NOTE — ED PROVIDER NOTE - NSFOLLOWUPINSTRUCTIONS_ED_ALL_ED_FT
1. You presented to the emergency department for: dizziness. You likely experienced an episode of peripheral vertigo.    2. Your evaluation in the emergency department included a physician evaluation. Your work-up did not reveal any findings indicating the need for admission to the hospital or any emergent interventions at this time.     3. It is recommended that you follow-up with your primary care provider for a repeat evaluation, and potentially further testing and treatment.     If needed, to arrange an appointment with a primary care provider please call: [phone]    4. Please continue taking your regular medications as prescribed.     5. PLEASE RETURN TO THE EMERGENCY DEPARTMENT IMMEDIATELY IF you develop any fevers not responding to over the counter medications, uncontrollable nausea and vomiting, an inability to tolerate eating and drinking, difficulty breathing, chest pain, a severe increase in your symptoms or pain, or any other new symptoms that concern you.

## 2024-05-13 NOTE — CONSULT NOTE ADULT - SUBJECTIVE AND OBJECTIVE BOX
Neurology - Consult Note    -  Spectra: [id number] ([facility]), [phone] ([facility])  -    HPI: Patient TOBI AL is a 78y (1945) woman with a PMHx significant for HTN, HLD, DM2 presented to the ED for dizziness. Patient accompanied by son who aid in translation. Patient stated that for the past three days she has intermittent room spinning dizziness upon movement (worse when turning left). Dizziness is decreased or resolved upon rest. She denies tinnitus, hearing loss, fevers, chills, weakness, numbness, changes in vision. No toxic habits. Lives with .  During times of intense dizziness she feels unsteady.  However she currently feels back to baseline after drinking water.  While in the ED vitals notable for blood pressure 164/78 other vital signs stable.       Review of Systems:     CONSTITUTIONAL: No fevers or chills  EYES AND ENT: No visual changes or no throat pain   NECK: No pain or stiffness  RESPIRATORY: No hemoptysis or shortness of breath  CARDIOVASCULAR: No chest pain or palpitations  GASTROINTESTINAL: No melena or hematochezia  GENITOURINARY: No dysuria or hematuria  NEUROLOGICAL: +As stated in HPI above  SKIN: No itching, burning, rashes, or lesions   All other review of systems is negative unless indicated above.    Allergies:  No Known Allergies      PMHx/PSHx/Family Hx: As above, otherwise see below   Chronic kidney disease, unspecified CKD stage    Diabetes mellitus    Hypertension        Social Hx:  No reported use of tobacco, alcohol, or illicit drugs    Medications:  MEDICATIONS  (STANDING):    MEDICATIONS  (PRN):        Home Medications:      Vitals:  T(C): 36.6 (05-13-24 @ 11:41), Max: 36.6 (05-13-24 @ 11:41)  HR: 75 (05-13-24 @ 11:41) (75 - 75)  BP: 164/78 (05-13-24 @ 11:41) (164/78 - 164/78)  RR: 16 (05-13-24 @ 11:41) (16 - 16)  SpO2: 99% (05-13-24 @ 11:41) (99% - 99%)    Physical Examination:    General - NAD  Cardiovascular - Peripheral pulses palpable, no edema    Neurologic Exam:  Mental status - Awake, Alert, Oriented to person, place, and time. Speech fluent, repetition and naming intact. Follows simple and complex commands. attention, concentration and fund of knowledge intact.     Cranial nerves:  CN II: Visual fields are full to confrontation. Pupils are 3 mm and briskly reactive to light.   CN III, IV, VI: EOMI, no nystagmus, no ptosis  CN V: Facial sensation is intact to pinprick in all 3 divisions bilaterally.  CN VII: Face is symmetric with normal eye closure and smile.  CN VII: Hearing is normal to rubbing fingers  CN IX, X: Palate elevates symmetrically. Phonation is normal.  CN XI: Head turning and shoulder shrug are intact  CN XII: Tongue is midline with normal movements and no atrophy.    Motor - Normal bulk and tone throughout. No pronator drift.  Strength testing            Deltoid      Biceps      Triceps     Wrist Extension    Wrist Flexion       R            5                 5               5                     5                              5                        5  L             5                 5               5                     5                              5                       5              Hip Flexion    Hip Extension    Knee Flexion    Knee Extension    Dorsiflexion    Plantar Flexion  R              5                           5                       5                           5                            5                          5  L              5                           5                        5                           5                            5                          5    neg hoffmans b/l    Sensation - Light touch/temperature intact throughout    DTR's -             Biceps      Triceps     Brachioradialis      Patellar    Ankle    Toes/plantar response  R             2+             2+                  2+                       2+            2+                 Down  L              2+             2+                 2+                        2+           2+                 Down    Coordination - Finger to Nose and heal to shin intact b/l. No tremors appreciated    Gait and station - Normal casual gait. Romberg (-)    Labs:                        11.6   6.88  )-----------( 220      ( 13 May 2024 13:44 )             35.8     05-13    137  |  106  |  31<H>  ----------------------------<  114<H>  5.3   |  19<L>  |  1.28    Ca    9.1      13 May 2024 13:44    TPro  7.5  /  Alb  4.2  /  TBili  0.3  /  DBili  x   /  AST  23  /  ALT  16  /  AlkPhos  128<H>  05-13    CAPILLARY BLOOD GLUCOSE      POCT Blood Glucose.: 181 mg/dL (13 May 2024 11:40)    LIVER FUNCTIONS - ( 13 May 2024 13:44 )  Alb: 4.2 g/dL / Pro: 7.5 g/dL / ALK PHOS: 128 U/L / ALT: 16 U/L / AST: 23 U/L / GGT: x             PT/INR - ( 13 May 2024 13:44 )   PT: 10.8 sec;   INR: 0.96 ratio         PTT - ( 13 May 2024 13:44 )  PTT:28.8 sec  CSF:                  Radiology:     Neurology - Consult Note    -  Spectra: [id number] ([facility]), [phone] ([facility])  -    HPI: Patient TOBI AL is a 78y (1945) woman with a PMHx significant for HTN, HLD, DM2 presented to the ED for dizziness. Patient accompanied by son who aid in translation. Patient stated that for the past three days she has intermittent room spinning dizziness upon movement (worse when turning left). Dizziness is decreased or resolved upon rest. She denies tinnitus, hearing loss, fevers, chills, weakness, numbness, changes in vision. No toxic habits. Lives with .  During times of intense dizziness she feels unsteady.  However she currently feels back to baseline after drinking water.  While in the ED vitals notable for blood pressure 164/78 other vital signs stable.  CTH and CTA neg for acute pathology     Review of Systems:     CONSTITUTIONAL: No fevers or chills  EYES AND ENT: No visual changes or no throat pain   NECK: No pain or stiffness  RESPIRATORY: No hemoptysis or shortness of breath  CARDIOVASCULAR: No chest pain or palpitations  GASTROINTESTINAL: No melena or hematochezia  GENITOURINARY: No dysuria or hematuria  NEUROLOGICAL: +As stated in HPI above  SKIN: No itching, burning, rashes, or lesions   All other review of systems is negative unless indicated above.    Allergies:  No Known Allergies      PMHx/PSHx/Family Hx: As above, otherwise see below   Chronic kidney disease, unspecified CKD stage    Diabetes mellitus    Hypertension        Social Hx:  No reported use of tobacco, alcohol, or illicit drugs    Medications:  MEDICATIONS  (STANDING):    MEDICATIONS  (PRN):        Home Medications:      Vitals:  T(C): 36.6 (05-13-24 @ 11:41), Max: 36.6 (05-13-24 @ 11:41)  HR: 75 (05-13-24 @ 11:41) (75 - 75)  BP: 164/78 (05-13-24 @ 11:41) (164/78 - 164/78)  RR: 16 (05-13-24 @ 11:41) (16 - 16)  SpO2: 99% (05-13-24 @ 11:41) (99% - 99%)    Physical Examination:    General - NAD  Cardiovascular - Peripheral pulses palpable, no edema    Neurologic Exam:  Mental status - Awake, Alert, Oriented to person, place, and time. Speech fluent, repetition and naming intact. Follows simple and complex commands. attention, concentration and fund of knowledge intact.     Cranial nerves:  CN II: Visual fields are full to confrontation. Pupils are 3 mm and briskly reactive to light.   CN III, IV, VI: EOMI, no nystagmus, no ptosis  CN V: Facial sensation is intact to pinprick in all 3 divisions bilaterally.  CN VII: Face is symmetric with normal eye closure and smile.  CN VII: Hearing is normal to rubbing fingers  CN IX, X: Palate elevates symmetrically. Phonation is normal.  CN XI: Head turning and shoulder shrug are intact  CN XII: Tongue is midline with normal movements and no atrophy.    Motor - Normal bulk and tone throughout. No pronator drift.  Strength testing            Deltoid      Biceps      Triceps     Wrist Extension    Wrist Flexion       R            5                 5               5                     5                              5                        5  L             5                 5               5                     5                              5                       5              Hip Flexion    Hip Extension    Knee Flexion    Knee Extension    Dorsiflexion    Plantar Flexion  R              5                           5                       5                           5                            5                          5  L              5                           5                        5                           5                            5                          5    neg hoffmans b/l    Sensation - Light touch/temperature intact throughout    DTR's -             Biceps      Triceps     Brachioradialis      Patellar    Ankle    Toes/plantar response  R             2+             2+                  2+                       2+            2+                 Down  L              2+             2+                 2+                        2+           2+                 Down    Coordination - Finger to Nose and heal to shin intact b/l. No tremors appreciated    Gait and station - Normal casual gait. Romberg (-)    Labs:                        11.6   6.88  )-----------( 220      ( 13 May 2024 13:44 )             35.8     05-13    137  |  106  |  31<H>  ----------------------------<  114<H>  5.3   |  19<L>  |  1.28    Ca    9.1      13 May 2024 13:44    TPro  7.5  /  Alb  4.2  /  TBili  0.3  /  DBili  x   /  AST  23  /  ALT  16  /  AlkPhos  128<H>  05-13    CAPILLARY BLOOD GLUCOSE      POCT Blood Glucose.: 181 mg/dL (13 May 2024 11:40)    LIVER FUNCTIONS - ( 13 May 2024 13:44 )  Alb: 4.2 g/dL / Pro: 7.5 g/dL / ALK PHOS: 128 U/L / ALT: 16 U/L / AST: 23 U/L / GGT: x             PT/INR - ( 13 May 2024 13:44 )   PT: 10.8 sec;   INR: 0.96 ratio         PTT - ( 13 May 2024 13:44 )  PTT:28.8 sec      Radiology:    < from: CT Angio Head w/ IV Cont (05.13.24 @ 14:45) >  IMPRESSION:    CT BRAIN  1. No evidence of acute territorial infarct, hemorrhage or mass effect.  2. Additional findings described in detail above.    CTA  1. Vertebral arteries patent, with left-sided dominance.  2. No significant stenosis, occlusion or dissection bilateral   extracranial carotid arteries.  3. No large vessel occlusion, definitive aneurysm or vascular   malformation intracranial circulation.  4. Additional findings, including those nonvascular, described in detail   above.    < end of copied text >

## 2024-05-13 NOTE — ED PROVIDER NOTE - CLINICAL SUMMARY MEDICAL DECISION MAKING FREE TEXT BOX
On exam patient is well-appearing.  Vitals notable for hypertension of 164/78.  Afebrile. Neurological exam notable for unsteady gait.  Some drifting with Romberg.  Other cerebellar tests including finger-to-nose diadochokinesia intact.  PERRLA. EOMI. no nystagmus either horizontal or vertical appreciated on exam.  Attempted to tad induce vertiginous symptoms with with [place]eline-Hallpike did not induce dizziness or nystagmus.   Vision 2020 in both eyes.  Neurovascularly otherwise intact cranial nerves intact. Presentation most concerning for posterior stroke versus benign paroxysmal vertigo versus vestibular neuritis.  Will order CT head and neck angio, will not call code stroke as it is beyond the 24-hour window. Patient currently denying any need for pain control.  Will also order labs.  Will reassess. On exam patient is well-appearing.  Vitals notable for hypertension of 164/78.  Afebrile. Neurological exam notable for subjectively unsteady gait though appears balanced.  Other cerebellar tests including finger-to-nose diadochokinesia intact.  PERRLA. EOMI. no nystagmus either horizontal or vertical appreciated on exam.  Attempted to tad induce vertiginous symptoms with with [place]-Hallpike did not induce dizziness or nystagmus.   Vision 2020 in both eyes.  Neurovascularly otherwise intact cranial nerves intact. Presentation most concerning for posterior stroke versus benign paroxysmal vertigo versus vestibular neuritis.  Will order CT head and neck angio, will not call code stroke as it is beyond the 24-hour window. Patient currently denying any need for pain control.  Will also order labs.  Will reassess.

## 2024-05-13 NOTE — ED ADULT NURSE NOTE - NSFALLUNIVINTERV_ED_ALL_ED
Bed/Stretcher in lowest position, wheels locked, appropriate side rails in place/Call bell, personal items and telephone in reach/Instruct patient to call for assistance before getting out of bed/chair/stretcher/Non-slip footwear applied when patient is off stretcher/[place] to call system/Physically safe environment - no spills, clutter or unnecessary equipment/Purposeful proactive rounding/Room/bathroom lighting operational, light cord in reach

## 2024-05-13 NOTE — ED PROVIDER NOTE - ATTENDING CONTRIBUTION TO CARE
GEN - NAD; well appearing; A+O x3   HEAD - NC/AT   EYES- PERRL, EOMI  ENT: Airway patent, mmm, Oral cavity and pharynx normal. No inflammation, swelling, exudate, or lesions.  NECK: Neck supple  PULMONARY - CTA b/l, symmetric breath sounds.   CARDIAC -s1s2, RRR, no M,G,R  ABDOMEN - +BS, ND, NT, soft, no guarding, no rebound, no masses   BACK - no CVA tenderness, Normal  spine   EXTREMITIES - FROM, symmetric pulses, capillary refill < 2 seconds, no edema   SKIN - no rash or bruising   NEUROLOGIC - ao3, cn2-12 intact, 5/5 strength in all extremities, sensation grossly intact, f-n nl, no dysdiadochokinesia, gait objectively steady (patient reports feeling unbalanced)  PSYCH -nl mood/affect, nl insight.  a/p-family at bedside translating at patient request after declining int. agree with above hpi. rrom spinning dizziness x 3d, worse with positional change and laying down, improves with staying still, feels unsteady whil walking. No other symptoms-no cp, sob, fevers, np, ns, abd pain, weakness, numbness, confusion, speech difficulty, ear pain, hearing loss, or tinnitus. No recent uris. Plan for labs, ct/cta, ekg, neuro c/s-suspect peripheral cause for vertiginous symptoms however given age and balance sensation eval for alt differentials including but not limited to neurovascular, elec dist, organ dysfunction. patient and family at bedside agreeable to plan. Not dizzy on eval.

## 2024-05-13 NOTE — ED ADULT TRIAGE NOTE - CHIEF COMPLAINT QUOTE
pt c/o dizziness x 3 days. pt endorses room spinning sensation that is worse when laying down and head movement. pt denies n/v.  hx- htn, DM2, HLD

## 2024-05-13 NOTE — CONSULT NOTE ADULT - ASSESSMENT
78y (1945) woman with a PMHx significant for HTN, HLD, DM2 presented to the ED for dizziness.. Dizziness is decreased or resolved upon rest. She is back to neurological baseline. Exam reassuring.     Impression:  acute vestibular syndrome likely secondary to peripheral etiology and low likelihood for acute ischemic stroke at this time given reassuring examination and negative CT imaging to my eye     Recommendations     Plan  [] F/u CT/CTA  [] BP measurements in both arms + orthostatic VS  [] EKG to evaluate for arrhythmias  [] Consider IVFs  [] Meclizine 25mg BID PRN (can increase to 50mg Q8) if symptoms return   [] Ondansetron 8mg up to q8hr PRN OR Reglan 4mg PRN q8h PRN for N/V     Other:  [] Refer for Vestibular Rehab on discharge  [] Epley manuever print out  [] ENT f/u outpatient    Case to be seen by attending.  Note incomplete until finalized by attending signature/attestation.

## 2024-05-13 NOTE — ED PROVIDER NOTE - OBJECTIVE STATEMENT
Please call pt back, lm with answering service   -year-old female with PMH of HTN, HLD, DM2 presents to the ED with complaints of dizziness for the past 3 days.  Patient states she has never had this sensation before but describes it as feeling like the room is spinning especially when she lays down and moves her head when she gets up.  Last known well was 3 days ago.  Patient also endorsing some unsteadiness while walking, feeling like she is going to fall over.  Patient denies any headaches or blurry vision or hearing disturbances.  Patient has had no nausea or vomiting, diarrhea, constipation, abdominal pain.  Patient denies any recent URI symptoms, fever, chills, sick contacts. Denies syncope, palpitations, near syncope.  Denies chest pain, shortness of breath.

## 2024-05-13 NOTE — ED PROVIDER NOTE - PROGRESS NOTE DETAILS
Irasema Hodges (PGY1): Reassessed patient and dizziness has completely resolved. Updated pt regarding negative workup. She agrees to and is ready for discharge home. She has an appetite, is ambulating well, has no complaints. BP normalized. She denies need for symptom control meds sent to pharmacy. Will provide discharge papers and return precautions.

## 2024-05-13 NOTE — ED PROVIDER NOTE - PATIENT PORTAL LINK FT
You can access the FollowMyHealth Patient Portal offered by [facility] by registering at the following website: [email or web address]. By joining [facility]’s FollowMyHealth portal, you will also be able to view your health information using other applications (apps) compatible with our system.